# Patient Record
Sex: FEMALE | Race: BLACK OR AFRICAN AMERICAN | NOT HISPANIC OR LATINO
[De-identification: names, ages, dates, MRNs, and addresses within clinical notes are randomized per-mention and may not be internally consistent; named-entity substitution may affect disease eponyms.]

---

## 2021-03-05 ENCOUNTER — APPOINTMENT (OUTPATIENT)
Dept: ANTEPARTUM | Facility: CLINIC | Age: 21
End: 2021-03-05
Payer: MEDICARE

## 2021-03-05 ENCOUNTER — ASOB RESULT (OUTPATIENT)
Age: 21
End: 2021-03-05

## 2021-03-05 PROCEDURE — 99072 ADDL SUPL MATRL&STAF TM PHE: CPT

## 2021-03-05 PROCEDURE — 76801 OB US < 14 WKS SINGLE FETUS: CPT

## 2021-03-05 PROCEDURE — 36416 COLLJ CAPILLARY BLOOD SPEC: CPT

## 2021-03-05 PROCEDURE — 76813 OB US NUCHAL MEAS 1 GEST: CPT

## 2021-05-06 ENCOUNTER — APPOINTMENT (OUTPATIENT)
Dept: ANTEPARTUM | Facility: CLINIC | Age: 21
End: 2021-05-06
Payer: MEDICARE

## 2021-05-06 ENCOUNTER — ASOB RESULT (OUTPATIENT)
Age: 21
End: 2021-05-06

## 2021-05-06 PROCEDURE — 76811 OB US DETAILED SNGL FETUS: CPT

## 2021-05-06 PROCEDURE — 99072 ADDL SUPL MATRL&STAF TM PHE: CPT

## 2021-05-20 ENCOUNTER — ASOB RESULT (OUTPATIENT)
Age: 21
End: 2021-05-20

## 2021-05-20 ENCOUNTER — APPOINTMENT (OUTPATIENT)
Dept: ANTEPARTUM | Facility: CLINIC | Age: 21
End: 2021-05-20
Payer: MEDICARE

## 2021-05-20 PROCEDURE — 76816 OB US FOLLOW-UP PER FETUS: CPT

## 2021-05-20 PROCEDURE — 99072 ADDL SUPL MATRL&STAF TM PHE: CPT

## 2021-09-08 ENCOUNTER — ASOB RESULT (OUTPATIENT)
Age: 21
End: 2021-09-08

## 2021-09-08 ENCOUNTER — APPOINTMENT (OUTPATIENT)
Dept: ANTEPARTUM | Facility: CLINIC | Age: 21
End: 2021-09-08
Payer: COMMERCIAL

## 2021-09-08 ENCOUNTER — APPOINTMENT (OUTPATIENT)
Dept: ANTEPARTUM | Facility: HOSPITAL | Age: 21
End: 2021-09-08

## 2021-09-08 PROCEDURE — 99212 OFFICE O/P EST SF 10 MIN: CPT | Mod: 25

## 2021-09-08 PROCEDURE — 76816 OB US FOLLOW-UP PER FETUS: CPT

## 2021-09-08 PROCEDURE — 76819 FETAL BIOPHYS PROFIL W/O NST: CPT

## 2021-09-16 ENCOUNTER — INPATIENT (INPATIENT)
Facility: HOSPITAL | Age: 21
LOS: 2 days | Discharge: HOME | End: 2021-09-19
Attending: OBSTETRICS & GYNECOLOGY | Admitting: OBSTETRICS & GYNECOLOGY

## 2021-09-16 VITALS
DIASTOLIC BLOOD PRESSURE: 62 MMHG | SYSTOLIC BLOOD PRESSURE: 103 MMHG | HEART RATE: 70 BPM | RESPIRATION RATE: 18 BRPM | TEMPERATURE: 99 F

## 2021-09-16 DIAGNOSIS — Z3A.00 WEEKS OF GESTATION OF PREGNANCY NOT SPECIFIED: ICD-10-CM

## 2021-09-16 DIAGNOSIS — Z33.2 ENCOUNTER FOR ELECTIVE TERMINATION OF PREGNANCY: Chronic | ICD-10-CM

## 2021-09-16 DIAGNOSIS — O26.899 OTHER SPECIFIED PREGNANCY RELATED CONDITIONS, UNSPECIFIED TRIMESTER: ICD-10-CM

## 2021-09-16 LAB
BASOPHILS # BLD AUTO: 0.03 K/UL — SIGNIFICANT CHANGE UP (ref 0–0.2)
BASOPHILS NFR BLD AUTO: 0.4 % — SIGNIFICANT CHANGE UP (ref 0–2)
BLD GP AB SCN SERPL QL: NEGATIVE — SIGNIFICANT CHANGE UP
EOSINOPHIL # BLD AUTO: 0.05 K/UL — SIGNIFICANT CHANGE UP (ref 0–0.5)
EOSINOPHIL NFR BLD AUTO: 0.7 % — SIGNIFICANT CHANGE UP (ref 0–6)
HCT VFR BLD CALC: 37.3 % — SIGNIFICANT CHANGE UP (ref 34.5–45)
HGB BLD-MCNC: 13 G/DL — SIGNIFICANT CHANGE UP (ref 11.5–15.5)
IANC: 5.4 K/UL — SIGNIFICANT CHANGE UP (ref 1.5–8.5)
IMM GRANULOCYTES NFR BLD AUTO: 0.4 % — SIGNIFICANT CHANGE UP (ref 0–1.5)
LYMPHOCYTES # BLD AUTO: 1.62 K/UL — SIGNIFICANT CHANGE UP (ref 1–3.3)
LYMPHOCYTES # BLD AUTO: 21.1 % — SIGNIFICANT CHANGE UP (ref 13–44)
MCHC RBC-ENTMCNC: 32.9 PG — SIGNIFICANT CHANGE UP (ref 27–34)
MCHC RBC-ENTMCNC: 34.9 GM/DL — SIGNIFICANT CHANGE UP (ref 32–36)
MCV RBC AUTO: 94.4 FL — SIGNIFICANT CHANGE UP (ref 80–100)
MONOCYTES # BLD AUTO: 0.55 K/UL — SIGNIFICANT CHANGE UP (ref 0–0.9)
MONOCYTES NFR BLD AUTO: 7.2 % — SIGNIFICANT CHANGE UP (ref 2–14)
NEUTROPHILS # BLD AUTO: 5.4 K/UL — SIGNIFICANT CHANGE UP (ref 1.8–7.4)
NEUTROPHILS NFR BLD AUTO: 70.2 % — SIGNIFICANT CHANGE UP (ref 43–77)
NRBC # BLD: 0 /100 WBCS — SIGNIFICANT CHANGE UP
NRBC # FLD: 0 K/UL — SIGNIFICANT CHANGE UP
PLATELET # BLD AUTO: 175 K/UL — SIGNIFICANT CHANGE UP (ref 150–400)
RBC # BLD: 3.95 M/UL — SIGNIFICANT CHANGE UP (ref 3.8–5.2)
RBC # FLD: 12.4 % — SIGNIFICANT CHANGE UP (ref 10.3–14.5)
RH IG SCN BLD-IMP: POSITIVE — SIGNIFICANT CHANGE UP
RH IG SCN BLD-IMP: POSITIVE — SIGNIFICANT CHANGE UP
SARS-COV-2 RNA SPEC QL NAA+PROBE: SIGNIFICANT CHANGE UP
WBC # BLD: 7.68 K/UL — SIGNIFICANT CHANGE UP (ref 3.8–10.5)
WBC # FLD AUTO: 7.68 K/UL — SIGNIFICANT CHANGE UP (ref 3.8–10.5)

## 2021-09-16 RX ORDER — INFLUENZA VIRUS VACCINE 15; 15; 15; 15 UG/.5ML; UG/.5ML; UG/.5ML; UG/.5ML
0.5 SUSPENSION INTRAMUSCULAR ONCE
Refills: 0 | Status: DISCONTINUED | OUTPATIENT
Start: 2021-09-16 | End: 2021-09-19

## 2021-09-16 RX ORDER — AMPICILLIN TRIHYDRATE 250 MG
2 CAPSULE ORAL ONCE
Refills: 0 | Status: COMPLETED | OUTPATIENT
Start: 2021-09-16 | End: 2021-09-16

## 2021-09-16 RX ORDER — SODIUM CHLORIDE 9 MG/ML
1000 INJECTION, SOLUTION INTRAVENOUS
Refills: 0 | Status: DISCONTINUED | OUTPATIENT
Start: 2021-09-16 | End: 2021-09-18

## 2021-09-16 RX ORDER — AMPICILLIN TRIHYDRATE 250 MG
1 CAPSULE ORAL EVERY 4 HOURS
Refills: 0 | Status: DISCONTINUED | OUTPATIENT
Start: 2021-09-16 | End: 2021-09-18

## 2021-09-16 RX ORDER — OXYTOCIN 10 UNIT/ML
333.33 VIAL (ML) INJECTION
Qty: 20 | Refills: 0 | Status: DISCONTINUED | OUTPATIENT
Start: 2021-09-16 | End: 2021-09-18

## 2021-09-16 RX ADMIN — Medication 216 GRAM(S): at 18:24

## 2021-09-16 RX ADMIN — Medication 108 GRAM(S): at 22:12

## 2021-09-16 RX ADMIN — SODIUM CHLORIDE 125 MILLILITER(S): 9 INJECTION, SOLUTION INTRAVENOUS at 20:02

## 2021-09-16 NOTE — OB PROVIDER H&P - ATTENDING COMMENTS
Patient is a 20y/o  @ 40 3/7wks gestation with decreased FM and NRFHT in the office.  GBS positive; for ampicillin  For po cytotec & cervical saini

## 2021-09-16 NOTE — OB PROVIDER TRIAGE NOTE - NSHPPHYSICALEXAM_GEN_ALL_CORE
Vital Signs  T(C): 37 (16 Sep 2021 16:31), Max: 37 (16 Sep 2021 16:31)  T(F): 98.6 (16 Sep 2021 16:31), Max: 98.6 (16 Sep 2021 16:31)  HR: 88 (16 Sep 2021 17:59) (70 - 102)  BP: 124/71 (16 Sep 2021 17:59) (103/62 - 124/71)  RR: 18 (16 Sep 2021 16:31) (18 - 18)    Abdomen gravid, soft and nontender  Continue EFM  SVE - 1-2/40/-3 Intact  TAS - Cephalic presentation/ant plac/jaz 17.03  BPP - 8/8  GBS - positive ; for ampicillin  EFW by kurt on 9/8/21 - 3373g (7lbs 7oz)

## 2021-09-16 NOTE — OB PROVIDER TRIAGE NOTE - PRETERM DELIVERIES, OB PROFILE
The above information was copied from a provider's documentation of pre-arrival medical information as obtained.
0

## 2021-09-16 NOTE — OB PROVIDER TRIAGE NOTE - HISTORY OF PRESENT ILLNESS
PNC Provider:  Dr Graham.  EDC:  2021.    Patient is a 20y/o  @ 40 3/7wks gestation who was sent from the office for prolonged monitoring due to nonreactive NST and reports of decreased fetal movements.  Denies lof, vaginal bleeding or contractions.    AP Course: anemia  Meds:  PNV & Iron  NKDA    PMHx - anemia  OBHx - TOP X 2  PSHx/GYN/SH/Psych - denies        PNC Provider:  Dr Graham.  EDC:  2021.  Patient is a 22y/o  @ 40 3/7wks gestation who was sent from the office for prolonged monitoring due to nonreactive NST and reports of decreased fetal movements.  Denies lof, vaginal bleeding or contractions.  Scheduled for IOL on 21.  Denies covid-19 infection/vaccination  AP Course: anemia  Meds:  PNV & Iron  NKDA  PMHx - anemia  OBHx - TOP X 2  PSHx/GYN/SH/Psych - denies

## 2021-09-16 NOTE — OB PROVIDER H&P - HISTORY OF PRESENT ILLNESS
PNC Provider:  Dr Graham.  EDC:  2021.  Patient is a 22y/o  @ 40 3/7wks gestation who was sent from the office for prolonged monitoring due to nonreactive NST and reports of decreased fetal movements.  Denies lof, vaginal bleeding or contractions.  Scheduled for IOL on 21.  Denies covid-19 infection/vaccination  AP Course: anemia  Meds:  PNV & Iron  NKDA    PMHx - anemia  OBHx - TOP X 2  PSHx/GYN/SH/Psych - denies

## 2021-09-16 NOTE — OB RN TRIAGE NOTE - NS_TRIAGEADDITIONAL COMMENTS_OBGYN_ALL_OB_FT
5'6 159lbs 5'6 159lbs    1630 M. TAL Gonzáles notified and aware of pt complaint and wait time for triage eval

## 2021-09-17 DIAGNOSIS — O36.8390 MATERNAL CARE FOR ABNORMALITIES OF THE FETAL HEART RATE OR RHYTHM, UNSPECIFIED TRIMESTER, NOT APPLICABLE OR UNSPECIFIED: ICD-10-CM

## 2021-09-17 LAB
COVID-19 SPIKE DOMAIN AB INTERP: NEGATIVE — SIGNIFICANT CHANGE UP
COVID-19 SPIKE DOMAIN ANTIBODY RESULT: 0.4 U/ML — SIGNIFICANT CHANGE UP
SARS-COV-2 IGG+IGM SERPL QL IA: 0.4 U/ML — SIGNIFICANT CHANGE UP
SARS-COV-2 IGG+IGM SERPL QL IA: NEGATIVE — SIGNIFICANT CHANGE UP
T PALLIDUM AB TITR SER: NEGATIVE — SIGNIFICANT CHANGE UP

## 2021-09-17 RX ORDER — PRAMOXINE HYDROCHLORIDE 150 MG/15G
1 AEROSOL, FOAM RECTAL EVERY 4 HOURS
Refills: 0 | Status: DISCONTINUED | OUTPATIENT
Start: 2021-09-17 | End: 2021-09-19

## 2021-09-17 RX ORDER — LANOLIN
1 OINTMENT (GRAM) TOPICAL EVERY 6 HOURS
Refills: 0 | Status: DISCONTINUED | OUTPATIENT
Start: 2021-09-17 | End: 2021-09-19

## 2021-09-17 RX ORDER — OXYCODONE HYDROCHLORIDE 5 MG/1
5 TABLET ORAL
Refills: 0 | Status: DISCONTINUED | OUTPATIENT
Start: 2021-09-17 | End: 2021-09-19

## 2021-09-17 RX ORDER — DIBUCAINE 1 %
1 OINTMENT (GRAM) RECTAL EVERY 6 HOURS
Refills: 0 | Status: DISCONTINUED | OUTPATIENT
Start: 2021-09-17 | End: 2021-09-19

## 2021-09-17 RX ORDER — DIPHENHYDRAMINE HCL 50 MG
25 CAPSULE ORAL EVERY 6 HOURS
Refills: 0 | Status: DISCONTINUED | OUTPATIENT
Start: 2021-09-17 | End: 2021-09-19

## 2021-09-17 RX ORDER — BENZOCAINE 10 %
1 GEL (GRAM) MUCOUS MEMBRANE EVERY 6 HOURS
Refills: 0 | Status: DISCONTINUED | OUTPATIENT
Start: 2021-09-17 | End: 2021-09-19

## 2021-09-17 RX ORDER — OXYTOCIN 10 UNIT/ML
333.33 VIAL (ML) INJECTION
Qty: 20 | Refills: 0 | Status: DISCONTINUED | OUTPATIENT
Start: 2021-09-17 | End: 2021-09-18

## 2021-09-17 RX ORDER — SODIUM CHLORIDE 9 MG/ML
3 INJECTION INTRAMUSCULAR; INTRAVENOUS; SUBCUTANEOUS EVERY 8 HOURS
Refills: 0 | Status: DISCONTINUED | OUTPATIENT
Start: 2021-09-17 | End: 2021-09-19

## 2021-09-17 RX ORDER — MAGNESIUM HYDROXIDE 400 MG/1
30 TABLET, CHEWABLE ORAL
Refills: 0 | Status: DISCONTINUED | OUTPATIENT
Start: 2021-09-17 | End: 2021-09-19

## 2021-09-17 RX ORDER — OXYCODONE HYDROCHLORIDE 5 MG/1
5 TABLET ORAL ONCE
Refills: 0 | Status: DISCONTINUED | OUTPATIENT
Start: 2021-09-17 | End: 2021-09-19

## 2021-09-17 RX ORDER — IBUPROFEN 200 MG
600 TABLET ORAL EVERY 6 HOURS
Refills: 0 | Status: COMPLETED | OUTPATIENT
Start: 2021-09-17 | End: 2022-08-16

## 2021-09-17 RX ORDER — HYDROCORTISONE 1 %
1 OINTMENT (GRAM) TOPICAL EVERY 6 HOURS
Refills: 0 | Status: DISCONTINUED | OUTPATIENT
Start: 2021-09-17 | End: 2021-09-19

## 2021-09-17 RX ORDER — SODIUM CHLORIDE 9 MG/ML
500 INJECTION, SOLUTION INTRAVENOUS ONCE
Refills: 0 | Status: COMPLETED | OUTPATIENT
Start: 2021-09-17 | End: 2021-09-17

## 2021-09-17 RX ORDER — ACETAMINOPHEN 500 MG
975 TABLET ORAL
Refills: 0 | Status: DISCONTINUED | OUTPATIENT
Start: 2021-09-17 | End: 2021-09-19

## 2021-09-17 RX ORDER — SIMETHICONE 80 MG/1
80 TABLET, CHEWABLE ORAL EVERY 4 HOURS
Refills: 0 | Status: DISCONTINUED | OUTPATIENT
Start: 2021-09-17 | End: 2021-09-19

## 2021-09-17 RX ORDER — ONDANSETRON 8 MG/1
4 TABLET, FILM COATED ORAL EVERY 4 HOURS
Refills: 0 | Status: DISCONTINUED | OUTPATIENT
Start: 2021-09-17 | End: 2021-09-18

## 2021-09-17 RX ORDER — OXYTOCIN 10 UNIT/ML
2 VIAL (ML) INJECTION
Qty: 30 | Refills: 0 | Status: DISCONTINUED | OUTPATIENT
Start: 2021-09-17 | End: 2021-09-18

## 2021-09-17 RX ORDER — KETOROLAC TROMETHAMINE 30 MG/ML
30 SYRINGE (ML) INJECTION ONCE
Refills: 0 | Status: DISCONTINUED | OUTPATIENT
Start: 2021-09-17 | End: 2021-09-17

## 2021-09-17 RX ORDER — TETANUS TOXOID, REDUCED DIPHTHERIA TOXOID AND ACELLULAR PERTUSSIS VACCINE, ADSORBED 5; 2.5; 8; 8; 2.5 [IU]/.5ML; [IU]/.5ML; UG/.5ML; UG/.5ML; UG/.5ML
0.5 SUSPENSION INTRAMUSCULAR ONCE
Refills: 0 | Status: COMPLETED | OUTPATIENT
Start: 2021-09-17

## 2021-09-17 RX ORDER — AER TRAVELER 0.5 G/1
1 SOLUTION RECTAL; TOPICAL EVERY 4 HOURS
Refills: 0 | Status: DISCONTINUED | OUTPATIENT
Start: 2021-09-17 | End: 2021-09-19

## 2021-09-17 RX ADMIN — SODIUM CHLORIDE 500 MILLILITER(S): 9 INJECTION, SOLUTION INTRAVENOUS at 23:15

## 2021-09-17 RX ADMIN — Medication 108 GRAM(S): at 02:06

## 2021-09-17 RX ADMIN — ONDANSETRON 4 MILLIGRAM(S): 8 TABLET, FILM COATED ORAL at 15:04

## 2021-09-17 RX ADMIN — Medication 108 GRAM(S): at 14:00

## 2021-09-17 RX ADMIN — Medication 108 GRAM(S): at 10:02

## 2021-09-17 RX ADMIN — Medication 30 MILLIGRAM(S): at 23:48

## 2021-09-17 RX ADMIN — Medication 1000 MILLIUNIT(S)/MIN: at 21:36

## 2021-09-17 RX ADMIN — Medication 975 MILLIGRAM(S): at 22:20

## 2021-09-17 RX ADMIN — Medication 108 GRAM(S): at 17:52

## 2021-09-17 RX ADMIN — Medication 108 GRAM(S): at 06:09

## 2021-09-17 RX ADMIN — Medication 975 MILLIGRAM(S): at 23:18

## 2021-09-17 RX ADMIN — SODIUM CHLORIDE 125 MILLILITER(S): 9 INJECTION, SOLUTION INTRAVENOUS at 10:03

## 2021-09-17 NOTE — CHART NOTE - NSCHARTNOTEFT_GEN_A_CORE
CNM OB Progress Note    Patient seen and evaluated at bedside.  Denies complaints.  Comfortable w/ anesthesia epidural.  Patient due for next dose of PO cytotec, to be examined now.    T(C): 37.0 (09-17-21 @ 10:29), Max: 37 (09-16-21 @ 16:31)  HR: 87 (09-17-21 @ 12:22) (70 - 122)  BP: 105/71 (09-17-21 @ 12:11) (103/62 - 127/70)  RR: 15 (09-16-21 @ 19:21) (15 - 18)  SpO2: 100% (09-17-21 @ 12:02) (91% - 100%)    SVE: 5/80/-3    EFM: 130 bpm, minimal variability, +accels,-decels  Ball:  q 4-6 minutes, irregular ctx    -continue IV Ampicillin q 4 hours for GBS positive  -Change from PO cytotec to IV pitocin, plan discussed with patient, all questions and concerns addressed, patient agrees to plan of AROM and change from PO cytotec to IV pitocin.   -AROM--scant amount of clear fluids   -Start IV Pitocin at 1pm, as allowed by tracing & contraction pattern  -Continue to monitor with EFM & TOCO  -Recheck patient in 2-4 hours or PRN    Discussed with MD Agnieszka Healy

## 2021-09-17 NOTE — OB PROVIDER LABOR PROGRESS NOTE - ASSESSMENT
A/P: 21y P0 admitted for IOL 2/2 decreased fetal movement    - s/p PO/CB  - c/w pit   - continuous monitoring/toco   - anticipate     Raven Bullock PGY1

## 2021-09-17 NOTE — OB RN DELIVERY SUMMARY - NS_SEPSISRSKCALC_OBGYN_ALL_OB_FT
EOS calculated successfully. EOS Risk Factor: 0.5/1000 live births ([facility] [place] national incidence); GA=40w5d; Temp=98.6; ROM=7.967; GBS='Positive'; Antibiotics='GBS specific antibiotics > 2 hrs prior to birth'

## 2021-09-17 NOTE — CHART NOTE - NSCHARTNOTEFT_GEN_A_CORE
CNM OB Progress Note    Patient seen and evaluated at bedside.  Reporting painful contractions. Cervical balloon expelled at 915am. Desiring pain intervention.    T(C): 36.7 (09-17-21 @ 06:35), Max: 37 (09-16-21 @ 16:31)  HR: 94 (09-17-21 @ 09:47) (70 - 102)  BP: 117/76 (09-17-21 @ 06:37) (103/62 - 126/64)  RR: 15 (09-16-21 @ 19:21) (15 - 18)  SpO2: --    SVE: 4/60/-3    EFM: 125 bpm, moderate variability  [place]:      A/P 21y P0 @ 40+4 weeks admitted for IOL for decreased fetal movement.   -Labor:   -Fetus:   -GBS positive--IV Ampicillin q 4 hours  -Analgesia: None  -Induction with: PO Cytotec    -Continue to monitor with EFM & TOCO  -Recheck patient in 2-4 hours or PRN    Discussed with MD ---    JOSETTE Healy CNM OB Progress Note    Patient seen and evaluated at bedside.  Reporting painful contractions. Cervical balloon expelled at 915am. Desiring pain intervention.    T(C): 36.7 (09-17-21 @ 06:35), Max: 37 (09-16-21 @ 16:31)  HR: 94 (09-17-21 @ 09:47) (70 - 102)  BP: 117/76 (09-17-21 @ 06:37) (103/62 - 126/64)  RR: 15 (09-16-21 @ 19:21) (15 - 18)  SpO2: --    SVE: 4/60/-3    EFM: 125 bpm, moderate variability, +accels,-decels  [place]: irregular contractions     A/P 21y P0 @ 40+4 weeks admitted for IOL for decreased fetal movement.   -Labor: category 1 tracing, baseline change from 130bpm to 125bpm  -GBS positive--IV Ampicillin q 4 hours  -Analgesia: None  -Induction with: PO Cytotec    -Desires pain intervention, Anesthesia epidural discussed, patient desires epidural and now wanting to discuss procedure with Anesthesia team before making final decision.   -Continue to monitor with EFM & TOCO  -Recheck patient in 2-4 hours or PRN    Addendum:  Anesthesia contacted, MD Adamson aware, plan is to consult patient and patient to finalize plan of taking/declining Anesthesia epidural procedure      Discussed with MD Agnieszka Healy

## 2021-09-17 NOTE — OB NEONATOLOGY/PEDIATRICIAN DELIVERY SUMMARY - NSPEDSNEONOTESA_OBGYN_ALL_OB_FT
Peds called to LDR for cat II delivery of  40.4 wk AGA female born via  to a 22 y/o  mother.  Maternal medical/surgical/pregnancy history of anemia. Maternal labs include Blood Type O+, HIV - , RPR NR , Rubella I , Hep B - , GBS +(received amp x7), COVID -. AROM at 12:24 on  with clear fluids (ROM hours: 8H).  Baby emerged vigorous, crying, was w/d/s/s with APGARS of 8/9. Nuchal x1. . Mom plans to initiate breastfeeding and formula feed, consents Hep B vaccine.  Highest maternal temp: 36.8. EOS 0.05.

## 2021-09-17 NOTE — OB PROVIDER LABOR PROGRESS NOTE - NS_SUBJECTIVE/OBJECTIVE_OBGYN_ALL_OB_FT
PGY1 Labor Note    Patient seen and evaluated at bedside.  Denies complaints.  Comfortable w/ epidural.      T(C): 36.8 (09-17-21 @ 16:32), Max: 37.0 (09-17-21 @ 10:29)  HR: 112 (09-17-21 @ 17:42) (71 - 143)  BP: 102/56 (09-17-21 @ 17:40) (101/59 - 133/62)  RR: 15 (09-16-21 @ 19:21) (15 - 15)  SpO2: 100% (09-17-21 @ 17:37) (91% - 100%)

## 2021-09-17 NOTE — OB RN DELIVERY SUMMARY - NSSELHIDDEN_OBGYN_ALL_OB_FT
[NS_DeliveryRN_OBGYN_ALL_OB_FT:[id number]],[NS_DeliveryAttending1_OBGYN_ALL_OB_FT:[id number]=]

## 2021-09-18 ENCOUNTER — TRANSCRIPTION ENCOUNTER (OUTPATIENT)
Age: 21
End: 2021-09-18

## 2021-09-18 LAB
HCT VFR BLD CALC: 29.2 % — LOW (ref 34.5–45)
HGB BLD-MCNC: 10.5 G/DL — LOW (ref 11.5–15.5)
MCHC RBC-ENTMCNC: 33.5 PG — SIGNIFICANT CHANGE UP (ref 27–34)
MCHC RBC-ENTMCNC: 36 GM/DL — SIGNIFICANT CHANGE UP (ref 32–36)
MCV RBC AUTO: 93.3 FL — SIGNIFICANT CHANGE UP (ref 80–100)
NRBC # BLD: 0 /100 WBCS — SIGNIFICANT CHANGE UP
NRBC # FLD: 0 K/UL — SIGNIFICANT CHANGE UP
PLATELET # BLD AUTO: 148 K/UL — LOW (ref 150–400)
RBC # BLD: 3.13 M/UL — LOW (ref 3.8–5.2)
RBC # FLD: 12.4 % — SIGNIFICANT CHANGE UP (ref 10.3–14.5)
WBC # BLD: 12.22 K/UL — HIGH (ref 3.8–10.5)
WBC # FLD AUTO: 12.22 K/UL — HIGH (ref 3.8–10.5)

## 2021-09-18 RX ORDER — ACETAMINOPHEN 500 MG
3 TABLET ORAL
Qty: 0 | Refills: 0 | DISCHARGE
Start: 2021-09-18

## 2021-09-18 RX ORDER — IBUPROFEN 200 MG
1 TABLET ORAL
Qty: 0 | Refills: 0 | DISCHARGE
Start: 2021-09-18

## 2021-09-18 RX ORDER — TETANUS TOXOID, REDUCED DIPHTHERIA TOXOID AND ACELLULAR PERTUSSIS VACCINE, ADSORBED 5; 2.5; 8; 8; 2.5 [IU]/.5ML; [IU]/.5ML; UG/.5ML; UG/.5ML; UG/.5ML
0.5 SUSPENSION INTRAMUSCULAR ONCE
Refills: 0 | Status: COMPLETED | OUTPATIENT
Start: 2021-09-18 | End: 2021-09-18

## 2021-09-18 RX ORDER — DOXYLAMINE SUCCINATE 25 MG
1 TABLET ORAL
Qty: 0 | Refills: 0 | DISCHARGE

## 2021-09-18 RX ORDER — IBUPROFEN 200 MG
600 TABLET ORAL EVERY 6 HOURS
Refills: 0 | Status: DISCONTINUED | OUTPATIENT
Start: 2021-09-18 | End: 2021-09-19

## 2021-09-18 RX ADMIN — Medication 600 MILLIGRAM(S): at 21:12

## 2021-09-18 RX ADMIN — Medication 600 MILLIGRAM(S): at 21:45

## 2021-09-18 RX ADMIN — Medication 600 MILLIGRAM(S): at 08:00

## 2021-09-18 RX ADMIN — TETANUS TOXOID, REDUCED DIPHTHERIA TOXOID AND ACELLULAR PERTUSSIS VACCINE, ADSORBED 0.5 MILLILITER(S): 5; 2.5; 8; 8; 2.5 SUSPENSION INTRAMUSCULAR at 21:11

## 2021-09-18 RX ADMIN — Medication 600 MILLIGRAM(S): at 14:00

## 2021-09-18 RX ADMIN — SODIUM CHLORIDE 3 MILLILITER(S): 9 INJECTION INTRAMUSCULAR; INTRAVENOUS; SUBCUTANEOUS at 14:04

## 2021-09-18 RX ADMIN — Medication 30 MILLIGRAM(S): at 01:00

## 2021-09-18 RX ADMIN — SODIUM CHLORIDE 3 MILLILITER(S): 9 INJECTION INTRAMUSCULAR; INTRAVENOUS; SUBCUTANEOUS at 21:45

## 2021-09-18 RX ADMIN — Medication 1 TABLET(S): at 13:16

## 2021-09-18 RX ADMIN — Medication 600 MILLIGRAM(S): at 07:17

## 2021-09-18 RX ADMIN — SODIUM CHLORIDE 3 MILLILITER(S): 9 INJECTION INTRAMUSCULAR; INTRAVENOUS; SUBCUTANEOUS at 04:11

## 2021-09-18 RX ADMIN — Medication 600 MILLIGRAM(S): at 13:20

## 2021-09-18 NOTE — DISCHARGE NOTE OB - PATIENT PORTAL LINK FT
You can access the FollowMyHealth Patient Portal offered by [facility] by registering at the following website: [email or web address]. By joining [facility]’s FollowMyHealth portal, you will also be able to view your health information using other applications (apps) compatible with our system.

## 2021-09-18 NOTE — OB PROVIDER DELIVERY SUMMARY - NSSELHIDDEN_OBGYN_ALL_OB_FT
[NS_DeliveryRN_OBGYN_ALL_OB_FT:[id number]],[NS_DeliveryAttending1_OBGYN_ALL_OB_FT:[id number]=]

## 2021-09-18 NOTE — OB PROVIDER DELIVERY SUMMARY - NSPROVIDERDELIVERYNOTE_OBGYN_ALL_OB_FT
, viable female , APGARS 8/9, weight 5me32ov   terminal sheree at delivery, kiwi applied but vacuum not performed due to rapid descent without assistance   RML episiotomy repaired with vicryl suture   ebl 400cc

## 2021-09-18 NOTE — DISCHARGE NOTE OB - MEDICATION SUMMARY - MEDICATIONS TO TAKE
I will START or STAY ON the medications listed below when I get home from the hospital:    acetaminophen 325 mg oral tablet  -- 3 tab(s) by mouth   -- Indication: For pain     ibuprofen 600 mg oral tablet  -- 1 tab(s) by mouth every 6 hours  -- Indication: For pain     PNV Prenatal oral tablet  -- 1 tab(s) by mouth once a day  -- Indication: For nutrition

## 2021-09-18 NOTE — PROGRESS NOTE ADULT - SUBJECTIVE AND OBJECTIVE BOX
Post-partum Note,   She is a  21y woman who is now post-partum day: 1    Subjective:  The patient feels well.  She is ambulating.   She is tolerating regular diet.  She denies nausea and vomiting; denies fever.  She is voiding.  Her pain is controlled.  She reports normal postpartum bleeding.    She is formula feeding.    Physical exam:    Vital Signs Last 24 Hrs  T(C): 37.1 (18 Sep 2021 10:12), Max: 37.1 (18 Sep 2021 10:12)  T(F): 98.8 (18 Sep 2021 10:12), Max: 98.8 (18 Sep 2021 10:12)  HR: 72 (18 Sep 2021 10:12) (71 - 144)  BP: 107/59 (18 Sep 2021 10:12) (101/49 - 170/-)  BP(mean): --  RR: 18 (18 Sep 2021 10:12) (16 - 18)  SpO2: 99% (18 Sep 2021 10:12) (89% - 100%)    Gen: NAD  Breast: Soft, nontender, not engorged.  Abdomen: Soft, nontender, no distension , firm uterine fundus at umbilicus.  Pelvic: Normal lochia noted  Ext: No calf tenderness    LABS:                        10.5   12.22 )-----------( 148      ( 17 Sep 2021 23:40 )             29.2       Rubella status:     Allergies    No Known Allergies    Intolerances      MEDICATIONS  (STANDING):  acetaminophen   Tablet .. 975 milliGRAM(s) Oral <User Schedule>  diphtheria/tetanus/pertussis (acellular) Vaccine (ADAcel) 0.5 milliLiter(s) IntraMuscular once  ibuprofen  Tablet. 600 milliGRAM(s) Oral every 6 hours  influenza   Vaccine 0.5 milliLiter(s) IntraMuscular once  ondansetron Injectable 4 milliGRAM(s) IV Push every 4 hours  prenatal multivitamin 1 Tablet(s) Oral daily  sodium chloride 0.9% lock flush 3 milliLiter(s) IV Push every 8 hours    MEDICATIONS  (PRN):  benzocaine 20%/menthol 0.5% Spray 1 Spray(s) Topical every 6 hours PRN for Perineal discomfort  dibucaine 1% Ointment 1 Application(s) Topical every 6 hours PRN Perineal discomfort  diphenhydrAMINE 25 milliGRAM(s) Oral every 6 hours PRN Pruritus  hydrocortisone 1% Cream 1 Application(s) Topical every 6 hours PRN Moderate Pain (4-6)  lanolin Ointment 1 Application(s) Topical every 6 hours PRN nipple soreness  magnesium hydroxide Suspension 30 milliLiter(s) Oral two times a day PRN Constipation  oxyCODONE    IR 5 milliGRAM(s) Oral every 3 hours PRN Moderate to Severe Pain (4-10)  oxyCODONE    IR 5 milliGRAM(s) Oral once PRN Moderate to Severe Pain (4-10)  pramoxine 1%/zinc 5% Cream 1 Application(s) Topical every 4 hours PRN Moderate Pain (4-6)  simethicone 80 milliGRAM(s) Chew every 4 hours PRN Gas  witch hazel Pads 1 Application(s) Topical every 4 hours PRN Perineal discomfort        Assessment and Plan  PPD #1 s/p   Doing well.  Encourage ambulation.  PP & PPD Instructions reviewed.  CPC.  D/C home tomorrow.

## 2021-09-18 NOTE — DISCHARGE NOTE OB - CARE PROVIDER_API CALL
Domingo De Jesus)  Obstetrics and Gynecology  [address] [place] [address]  Phone: [phone]  Fax: [phone]  Follow Up Time:

## 2021-09-18 NOTE — OB POSTPARTUM EVENT NOTE - NS_EVENTSUMMARY1_OBGYN_ALL_OB_FT
at 40.4 weeks.  at 20.22, , RML. Bleeding moderate, fundus firm, at midline. Pt feels dizzy and weak.

## 2021-09-18 NOTE — LACTATION INITIAL EVALUATION - INTERVENTION OUTCOME
On request of nurse, P1 mom Instructed in hand expression and massage with good return demonstration, + colostrum able to latch infant to breast with short burst but deep latch, infant is sleepy and needed stimulation to stay awake, encouraged to call for further assistance./verbalizes understanding/demonstrates understanding of teaching/good return demonstration/needs met/Lactation team to follow up

## 2021-09-18 NOTE — DISCHARGE NOTE OB - MEDICATION SUMMARY - MEDICATIONS TO STOP TAKING
I will STOP taking the medications listed below when I get home from the hospital:    Unisom 25 mg oral tablet  -- 1 tab(s) by mouth once a day

## 2021-09-18 NOTE — LACTATION INITIAL EVALUATION - LACTATION INTERVENTIONS
initiate/review safe skin-to-skin/initiate/review hand expression/initiate/review techniques for position and latch/review techniques to increase milk supply/review techniques to manage sore nipples/engorgement/initiate/review breast massage/compression/reviewed components of an effective feeding and at least 8 effective feedings per day required/reviewed importance of monitoring infant diapers, the breastfeeding log, and minimum output each day/reviewed risks of unnecessary formula supplementation/reviewed risks of artificial nipples/reviewed strategies to transition to breastfeeding only/reviewed benefits and recommendations for rooming in/reviewed feeding on demand/by cue at least 8 times a day

## 2021-09-19 VITALS
RESPIRATION RATE: 18 BRPM | DIASTOLIC BLOOD PRESSURE: 65 MMHG | HEART RATE: 81 BPM | TEMPERATURE: 98 F | OXYGEN SATURATION: 100 % | SYSTOLIC BLOOD PRESSURE: 108 MMHG

## 2021-09-19 RX ADMIN — Medication 600 MILLIGRAM(S): at 03:32

## 2021-09-19 RX ADMIN — Medication 600 MILLIGRAM(S): at 09:26

## 2021-09-19 RX ADMIN — SODIUM CHLORIDE 3 MILLILITER(S): 9 INJECTION INTRAMUSCULAR; INTRAVENOUS; SUBCUTANEOUS at 06:06

## 2021-09-19 RX ADMIN — Medication 975 MILLIGRAM(S): at 14:13

## 2021-09-19 RX ADMIN — Medication 600 MILLIGRAM(S): at 04:05

## 2021-09-19 RX ADMIN — Medication 1 APPLICATION(S): at 00:23

## 2021-09-19 RX ADMIN — Medication 975 MILLIGRAM(S): at 06:40

## 2021-09-19 RX ADMIN — Medication 1 TABLET(S): at 13:39

## 2021-09-19 RX ADMIN — Medication 975 MILLIGRAM(S): at 06:06

## 2021-09-19 RX ADMIN — Medication 975 MILLIGRAM(S): at 13:39

## 2021-09-19 RX ADMIN — Medication 600 MILLIGRAM(S): at 10:10

## 2021-09-19 RX ADMIN — PRAMOXINE HYDROCHLORIDE 1 APPLICATION(S): 150 AEROSOL, FOAM RECTAL at 00:22

## 2021-09-19 RX ADMIN — AER TRAVELER 1 APPLICATION(S): 0.5 SOLUTION RECTAL; TOPICAL at 00:23

## 2021-09-19 RX ADMIN — Medication 975 MILLIGRAM(S): at 00:21

## 2021-09-19 RX ADMIN — Medication 975 MILLIGRAM(S): at 01:00

## 2021-09-19 RX ADMIN — Medication 1 APPLICATION(S): at 00:22

## 2021-09-19 NOTE — PROGRESS NOTE ADULT - SUBJECTIVE AND OBJECTIVE BOX
Post-partum Note,   She is a  21y woman who is now post-partum day: 1    Subjective:  The patient feels well.  She is ambulating.   She is tolerating regular diet.  She denies nausea and vomiting; denies fever.  She is voiding.  Her pain is controlled.  She reports normal postpartum bleeding.  She is breastfeeding.      Physical exam:    Vital Signs Last 24 Hrs  T(C): 36.6 (19 Sep 2021 05:38), Max: 36.7 (18 Sep 2021 17:46)  T(F): 97.8 (19 Sep 2021 05:38), Max: 98 (18 Sep 2021 17:46)  HR: 81 (19 Sep 2021 05:38) (81 - 90)  BP: 102/65 (19 Sep 2021 05:38) (100/64 - 102/65)  BP(mean): --  RR: 18 (19 Sep 2021 05:38) (18 - 18)  SpO2: 100% (19 Sep 2021 05:38) (100% - 100%)    Gen: NAD  Breast: Soft, nontender, not engorged.  Abdomen: Soft, nontender, no distension , firm uterine fundus at umbilicus.  Pelvic: Normal lochia noted  Ext: No calf tenderness    LABS:                        10.5   12.22 )-----------( 148      ( 17 Sep 2021 23:40 )             29.2       Rubella status:     Allergies    No Known Allergies    Intolerances      MEDICATIONS  (STANDING):  acetaminophen   Tablet .. 975 milliGRAM(s) Oral <User Schedule>  ibuprofen  Tablet. 600 milliGRAM(s) Oral every 6 hours  influenza   Vaccine 0.5 milliLiter(s) IntraMuscular once  prenatal multivitamin 1 Tablet(s) Oral daily  sodium chloride 0.9% lock flush 3 milliLiter(s) IV Push every 8 hours    MEDICATIONS  (PRN):  benzocaine 20%/menthol 0.5% Spray 1 Spray(s) Topical every 6 hours PRN for Perineal discomfort  dibucaine 1% Ointment 1 Application(s) Topical every 6 hours PRN Perineal discomfort  diphenhydrAMINE 25 milliGRAM(s) Oral every 6 hours PRN Pruritus  hydrocortisone 1% Cream 1 Application(s) Topical every 6 hours PRN Moderate Pain (4-6)  lanolin Ointment 1 Application(s) Topical every 6 hours PRN nipple soreness  magnesium hydroxide Suspension 30 milliLiter(s) Oral two times a day PRN Constipation  oxyCODONE    IR 5 milliGRAM(s) Oral every 3 hours PRN Moderate to Severe Pain (4-10)  oxyCODONE    IR 5 milliGRAM(s) Oral once PRN Moderate to Severe Pain (4-10)  pramoxine 1%/zinc 5% Cream 1 Application(s) Topical every 4 hours PRN Moderate Pain (4-6)  simethicone 80 milliGRAM(s) Chew every 4 hours PRN Gas  witch hazel Pads 1 Application(s) Topical every 4 hours PRN Perineal discomfort        Assessment and Plan  PPD #1 s/p   Doing well.  Encourage ambulation.  PP & PPD Instructions reviewed.  CPC.  D/C home today if baby is cleared

## 2025-06-04 ENCOUNTER — NON-APPOINTMENT (OUTPATIENT)
Age: 25
End: 2025-06-04

## 2025-07-02 NOTE — OB PROVIDER H&P - ASSESSMENT
Patient is a 20y/o  @ 40 3/7wks gestation with decreased FM and NRFHT in the office.  GBS positive; for ampicillin  For po cytotec & cervical saini
normal/clear to auscultation bilaterally/no wheezes/no rales/no rhonchi